# Patient Record
Sex: FEMALE
[De-identification: names, ages, dates, MRNs, and addresses within clinical notes are randomized per-mention and may not be internally consistent; named-entity substitution may affect disease eponyms.]

---

## 2020-11-25 ENCOUNTER — HOSPITAL ENCOUNTER (INPATIENT)
Dept: HOSPITAL 56 - MW.OBCHECK | Age: 25
LOS: 4 days | Discharge: HOME | End: 2020-11-29
Attending: OBSTETRICS & GYNECOLOGY | Admitting: OBSTETRICS & GYNECOLOGY
Payer: COMMERCIAL

## 2020-11-25 DIAGNOSIS — Z3A.37: ICD-10-CM

## 2020-11-25 DIAGNOSIS — Z20.828: ICD-10-CM

## 2020-11-25 PROCEDURE — U0002 COVID-19 LAB TEST NON-CDC: HCPCS

## 2020-11-25 RX ADMIN — MISOPROSTOL PRN MCG: 100 TABLET ORAL at 17:00

## 2020-11-25 RX ADMIN — MISOPROSTOL PRN MCG: 100 TABLET ORAL at 21:21

## 2020-11-26 PROCEDURE — 3E0P7VZ INTRODUCTION OF HORMONE INTO FEMALE REPRODUCTIVE, VIA NATURAL OR ARTIFICIAL OPENING: ICD-10-PCS | Performed by: OBSTETRICS & GYNECOLOGY

## 2020-11-26 PROCEDURE — 0HQ9XZZ REPAIR PERINEUM SKIN, EXTERNAL APPROACH: ICD-10-PCS | Performed by: OBSTETRICS & GYNECOLOGY

## 2020-11-26 PROCEDURE — 3E0R3BZ INTRODUCTION OF ANESTHETIC AGENT INTO SPINAL CANAL, PERCUTANEOUS APPROACH: ICD-10-PCS | Performed by: OBSTETRICS & GYNECOLOGY

## 2020-11-26 PROCEDURE — 3E033VJ INTRODUCTION OF OTHER HORMONE INTO PERIPHERAL VEIN, PERCUTANEOUS APPROACH: ICD-10-PCS | Performed by: OBSTETRICS & GYNECOLOGY

## 2020-11-26 RX ADMIN — MISOPROSTOL PRN MCG: 100 TABLET ORAL at 01:06

## 2020-11-26 RX ADMIN — MAGNESIUM SULFATE IN WATER SCH MLS/HR: 40 INJECTION, SOLUTION INTRAVENOUS at 12:58

## 2020-11-26 RX ADMIN — MISOPROSTOL PRN MCG: 100 TABLET ORAL at 05:22

## 2020-11-26 NOTE — PCM.DEL
L & D Note





- General Info


Date of Service: 20


Mother's Due Date: 20





- Delivery Note


Labor: Induced by Oxytocin (and misoprostol)


Cervical Ripening Method: Misoprostil


Delivery Outcome: Livebirth


Infant Delivery Method: Spontaneous Vaginal Delivery-Single


Infant Delivery Mode: Spontaneous


Birth Presentation: Left Occiput Anterior (ANJU)


Nuchal Cord: Present (x1, delivered through)


Anesthesia Type: Epidural


Amniotic Fluid Description: Clear


Episiotomy Type: None


Laceration: 1st Degree


Suture type: Vicryl


Suture size: 2-0


Placenta: Intact, Spontaneous


Cord: 3 Vessels


Estimated Blood Loss: 250


Resuscitation Needed: No


: Bulb Syringe, Stimulated, Warmed, [place] Used, Warmer Used


APGAR Score 1 min: 7


APGAR Score 5 min: 9


Delivery Comments (Free Text/Narrative):: 





Normal appearing, small for gestational age male in cephalic presentation. 

APGARs 7/9. Weight 2300 grams.





- General Info


Date of Service: 20





- Patient Data


Weight - Most Recent: 186 lb


I&O - Last 24 Hours: 


                                 Intake & Output











 20





 06:59 14:59 22:59


 


Intake Total  1415 


 


Output Total  650 


 


Balance  765 











Lab Results Last 24 Hours: 


                         Laboratory Results - last 24 hr











  20 Range/Units





  20:30 02:48 08:50 


 


Magnesium  5.0 H  5.6 H  6.2 H  (1.8-2.4)  mg/dL














  20 Range/Units





  14:30 


 


Magnesium  6.6 H  (1.8-2.4)  mg/dL











Med Orders - Current: 


                               Current Medications





Butorphanol Tartrate (Stadol)  1 mg IVPUSH Q1H PRN


   PRN Reason: Pain


Carboprost Tromethamine (Hemabate Ds)  250 mcg IM ASDIRECTED PRN


   PRN Reason: Post Partum Hemorrhage


Oxytocin/Sodium Chloride (Oxytocin 30 Unit/500 Ml-Ns)  30 unit in 500 mls @ 999 

mls/hr IV TITRATE ZAID


Tranexamic Acid 1,000 mg/ (Sodium Chloride)  110 mls @ 660 mls/hr IV ONETIME PRN


   PRN Reason: Bleeding


Lactated Ringer's (Ringers, Lactated)  1,000 mls @ 150 mls/hr IV ASDIRECTED ZAID


   Last Infusion: 20 11:46 Dose:  Infused


   Documented by: 


Sodium Chloride (Normal Saline)  1,000 mls @ 5 mls/hr IV ASDIRECTED ZAID


   Last Admin: 20 16:13 Dose:  5 mls/hr


   Documented by: 


Oxytocin/Sodium Chloride (Oxytocin 30 Unit/500 Ml-Ns)  30 unit in 500 mls @ 2 

mls/hr IV TITRATE [facility]; Protocol


   Last Titration: 20 13:59 Dose:  8 munits/min, 8 mls/hr


   Documented by: 


Magnesium Sulfate (Magnesium Sulfate In Water Premix)  20 gm in 500 mls @ 50 

mls/hr IV ASDIRECTED [facility]; Protocol


   Last Admin: 20 12:58 Dose:  2 gm/hr, 50 mls/hr


   Documented by: 


Lidocaine HCl (Xylocaine 1%)  50 ml INJECT ONETIME PRN


   PRN Reason: Laceration repair


Methylergonovine Maleate (Methergine)  0.2 mg IM ASDIRECTED PRN


   PRN Reason: Post Partum Hemorrhage


Misoprostol (Cytotec)  200 mcg PO ONETIME PRN


   PRN Reason: Post Partum Hemorrhage


Misoprostol (Cytotec)  25 mcg VAG ONETIME PRN


   PRN Reason: Cervical Ripening


Misoprostol (Cytotec)  25 mcg VAG Q4H PRN


   PRN Reason: Cervical Ripening


   Last Admin: 20 05:22 Dose:  25 mcg


   Documented by: 


Nalbuphine HCl (Nubain)  10 mg IVPUSH Q1H PRN


   PRN Reason: Pain (severe 7-10)


Ondansetron HCl (Zofran)  4 mg IVPUSH Q6H PRN


   PRN Reason: Nausea/Vomiting


Sodium Chloride (Saline Flush)  10 ml FLUSH ASDIRECTED PRN


   PRN Reason: Keep Vein Open


Sodium Chloride (Saline Flush)  2.5 ml FLUSH ASDIRECTED PRN


   PRN Reason: Keep Vein Open


Sodium Chloride (Normal Saline)  10 ml IV ASDIRECTED PRN


   PRN Reason: IV Use


Sodium Chloride (Saline Flush)  10 ml FLUSH ASDIRECTED PRN


   PRN Reason: Keep Vein Open


Sodium Chloride (Saline Flush)  2.5 ml FLUSH ASDIRECTED PRN


   PRN Reason: Keep Vein Open


Sodium Chloride (Normal Saline)  10 ml IV ASDIRECTED PRN


   PRN Reason: IV Use


Sterile Water (Sterile Water For Irrigation)  1,000 ml IRR ASDIRECTED PRN


   PRN Reason: delivery


Terbutaline Sulfate (Brethine)  0.25 mg SUBCUT ASDIRECTED PRN


   PRN Reason: Tacysystole





Discontinued Medications





Calcium Gluconate (Calcium Gluconate)  1 gm IV ASDIRECTED PRN


   PRN Reason: respiratory distress


Fentanyl (Sublimaze) Confirm Administered Dose 100 mcg .ROUTE .STK-MED ONE


   Stop: 20 11:03


Magnesium Sulfate 4 gm/ Premix  100 mls @ 300 mls/hr IV BOLUS ONE


   Stop: 20 15:49


   Last Admin: 20 16:15 Dose:  333 mls/hr


   Documented by: 


Magnesium Sulfate (Magnesium Sulfate In Water Premix)  20 gm in 500 mls @ 50 

mls/hr IV ASDIRECTED [facility]; Protocol


   Last Admin: 20 16:33 Dose:  2 gm/hr, 50 mls/hr


   Documented by: 


Magnesium Sulfate (Magnesium Sulfate In Water Premix) Confirm Administered Dose 

20 gm in 500 mls @ as directed .ROUTE .STK-MED ONE


   Stop: 20 02:46


   Last Admin: 20 02:50 Dose:  50 mls/hr


   Documented by: 


Ropivacaine (Naropin 0.2%) Confirm Administered Dose 100 mls @ as directed 

.ROUTE .STK-MED ONE


   Stop: 20 11:04











- Problem List Review


Problem List Initiated/Reviewed/Updated: Yes





- My Orders


Last 24 Hours: 


My Active Orders





20 18:01


Acetaminophen [Tylenol Extra Strength]   1,000 mg PO Q4H PRN 


Acetaminophen [Tylenol Extra Strength]   500 mg PO Q4H PRN 


Benzocaine/Menthol [Dermoplast Pain Relief 20%-0.5% Spray]   78 gm TOP 

ASDIRECTED PRN 


Calcium Gluconate   1 gm IV ASDIRECTED PRN 


Docusate Sodium [Colace]   100 mg PO BID PRN 


Ibuprofen [Motrin]   400 mg PO Q4H PRN 


Ibuprofen [Motrin]   800 mg PO Q6H PRN 


Lanolin [Lansinoh HPA]   See Dose Instructions  TOP ASDIRECTED PRN 


Magnesium Sulfate/Water [Magnesium Sulfate in Water Premix] 4 gm   Premix Bag 1 

bag IV BOLUS 


Sodium Chloride 0.9% [Normal Saline]   10 ml IV ASDIRECTED PRN 


Sodium Chloride 0.9% [Saline Flush]   10 ml FLUSH ASDIRECTED PRN 


Sodium Chloride 0.9% [Saline Flush]   2.5 ml FLUSH ASDIRECTED PRN 


bisacodyL [Dulcolax]   10 mg RECTAL ONETIME PRN 


oxyCODONE   5 mg PO Q2H PRN 


witch hazeL [Tucks]   1 pad TOP ASDIRECTED PRN 





20 18:02


Patient Status [ADT] Routine 


Bedrest [RC] ASDIRECTED 


Communication Order [RC] PRN 


Communication Order [RC] PRN 


Height and Weight [RC] DAILY 


Intake and Output [RC] QSHIFT 


May Shower [RC] ASDIRECTED 


Notify Provider [RC] PRN 


Oxygen Therapy [RC] PRN 


Up ad Radha [RC] ASDIRECTED 


Vital Signs [RC] ASDIRECTED 


Vital Signs [RC] PER UNIT ROUTINE 


CBC W/O DIFF,HEMOGRAM [HEME] Routine 


COMPREHENSIVE METABOLIC PN,CMP [CHEM] Routine 


URIC ACID [CHEM] Routine 


Assess Lochia [WOMSER] Per Unit Routine 


Assess Uterine Involution [WOMSER] Per Unit Routine 


Electronic Fetal Heart Tones Ext w TOCO [WOMSER] Per Unit Routine 


Peripheral IV Discontinue [OM.PC] Routine 


Peripheral IV Insertion Adult [OM.PC] Routine 





20 18:03


Fetal Heart Tones [RC] ASDIRECTED 





20 18:05


Equipment to Bedside [RC] PRN 


Notify Provider Status Change [RC] ASDIRECTED 





20 18:15


Magnesium Sulfate/Water [Magnesium Sulfate in Water Premix] 20 gm in 500 ml IV 

ASDIRECTED 


Deep Tendon Reflexes [WOMSER] Q1H 





20 19:15


Deep Tendon Reflexes [WOMSER] Q1H 





20 20:15


Deep Tendon Reflexes [WOMSER] Q1H 





20 21:15


Deep Tendon Reflexes [WOMSER] Q1H 





20 22:15


Deep Tendon Reflexes [WOMSER] Q1H 





20 23:15


Deep Tendon Reflexes [WOMSER] Q1H 





20 00:15


Deep Tendon Reflexes [WOMSER] Q1H 





20 01:15


Deep Tendon Reflexes [WOMSER] Q1H 





20 02:15


Deep Tendon Reflexes [WOMSER] [facility] 





20 03:15


Deep Tendon Reflexes [WOMSER] [facility] 





20 04:15


Deep Tendon Reflexes [WOMSER] [facility] 





20 05:15


Deep Tendon Reflexes [WOMSER] [facility] 





20 06:15


Deep Tendon Reflexes [WOMSER] [facility] 





20 07:15


Deep Tendon Reflexes [WOMSER] [facility] 





20 08:15


Deep Tendon Reflexes [WOMSER] [facility] 





20 09:15


Deep Tendon Reflexes [WOMSER] [facility] 





20 10:15


Deep Tendon Reflexes [WOMSER] [facility] 





20 11:15


Deep Tendon Reflexes [WOMSER] [facility] 





20 12:15


Deep Tendon Reflexes [WOMSER] [facility] 





20 13:15


Deep Tendon Reflexes [WOMSER] [facility] 





20 14:15


Deep Tendon Reflexes [WOMSER] [facility] 





20 15:15


Deep Tendon Reflexes [WOMSER] [facility] 





20 16:15


Deep Tendon Reflexes [WOMSER] [facility] 





20 17:15


Deep Tendon Reflexes [WOMSER] Q1H 














- Assessment


Assessment:: 





25 year old G1 now P1 female s/p spontaneous vaginal delivery at 37w1d with 

preeclampsia with severe features





- Plan


Plan:: 





Routine postpartum cares


* Rh positive, rubella immune, GBS negative


* Encourage ambulation and fluid intake when able


* PO pain medication ordered for PRN use


* Regular diet when tolerating PO


* Breastfeeding, nursing assistance as needed


* Monitor postpartum bleeding





Preeclampsia with severe features


* VSS, BP normotensive to mild range during labor. Will continue to monitor per 

  protocol.


* Asymptomatic.


* Continue Magnesium sulfate x24hrs postpartum for seizure prophylaxis.


* Minor catheter for assessment of output.


* Repeat preeclampsia labs and magnesium level in the AM.


* Will use antihypertensive therapy as indicated.





Dispo: stable. Patient and infant recovering in LDR room at this time.





Dictation #[id number]

## 2020-11-26 NOTE — PCM.PRNOTE
- Free Text/Narrative


Note: 





Anes Note





Patient requests epidural for L&D.





Sterile fenestrated drape applied.  Sitting position. Level L3-L4 midline 

approach. Sterile technique. Chloraprep scrub to lumbar area. 





Sterile fenestrated drape applied.  Epidural space easily achieved single 

attempt using ELENO technique. ELENO at 3 cm. Cath threaded 6 cm with ease. Cath 

secured a t skin using sterile clear adhesive dressing. 





Test 1110 3 cc 1.5% lido with epi negative.





Load 1115 10 cc 0.2% ropivicaine with 1 mcg cc fentanyl in slow divided doses. 





Pump started with 90 cc same solution. Rate is 8 cc hr with 6 cc q 20 min prn 

bolus. 





Adriana well.





Time with patient [id number]





Josh Gil CRNA

## 2020-11-26 NOTE — PCM.PREANE
Preanesthetic Assessment





- Anesthesia/Transfusion/Family Hx


Anesthesia History: Prior Anesthesia Without Reaction


Family History of Anesthesia Reaction: No





- Review of Systems


Cardiovascular: No Symptoms (Pre Eclampsia with pitting edema. On MG drip...)





- Physical Assessment


NPO Status Date: 11/26/20


NPO Status Time: 06:00


Height: 1.68 m


Weight: 84.368 kg


ASA Class: 2





- Lab


Values: 





                             Laboratory Last Values











Magnesium  6.2 mg/dL (1.8-2.4)  H  11/26/20  08:50    


 


SARS-CoV-2 RNA (KAI)  NEGATIVE  (NEGATIVE)   11/25/20  16:10    


 


Blood Type  A POSITIVE   11/25/20  16:19    


 


Antibody Screen  NEGATIVE   11/25/20  16:19    














- Allergies


Allergies/Adverse Reactions: 


                                    Allergies











Allergy/AdvReac Type Severity Reaction Status Date / Time


 


pumpkin Allergy  Hives Verified 11/25/20 15:27














- Acknowledgements


Anesthesia Type Planned: Epidural


Pt an Appropriate Candidate for the Planned Anesthesia: Yes


Alternatives and Risks of Anesthesia Discussed w Pt/Guardian: Yes


Pt/Guardian Understands and Agrees with Anesthesia Plan: Yes





PreAnesthesia Questionnaire





- Past Health History


Medical/Surgical History: Denies Medical/Surgical History


OB/GYN History: Reports: Pregnancy





- SUBSTANCE USE


Tobacco Use Status *Q: Never Tobacco User


Recreational Drug Use History: No





- CURRENT (IN HOUSE) MEDS


Current Meds: 





                               Current Medications





Butorphanol Tartrate (Stadol)  1 mg IVPUSH Q1H PRN


   PRN Reason: Pain


Carboprost Tromethamine (Hemabate Ds)  250 mcg IM ASDIRECTED PRN


   PRN Reason: Post Partum Hemorrhage


Oxytocin/Sodium Chloride (Oxytocin 30 Unit/500 Ml-Ns)  30 unit in 500 mls @ 999 

mls/hr IV TITRATE ZAID


Tranexamic Acid 1,000 mg/ (Sodium Chloride)  110 mls @ 660 mls/hr IV ONETIME PRN


   PRN Reason: Bleeding


Lactated Ringer's (Ringers, Lactated)  1,000 mls @ 150 mls/hr IV ASDIRECTED ZAID


   Last Admin: 11/26/20 10:20 Dose:  999 mls/hr


   Documented by: 


Sodium Chloride (Normal Saline)  1,000 mls @ 5 mls/hr IV ASDIRECTED ZAID


   Last Admin: 11/25/20 16:13 Dose:  5 mls/hr


   Documented by: 


Oxytocin/Sodium Chloride (Oxytocin 30 Unit/500 Ml-Ns)  30 unit in 500 mls @ 2 

mls/hr IV TITRATE ZAID; Protocol


   Last Titration: 11/26/20 10:16 Dose:  4 munits/min, 4 mls/hr


   Documented by: 


Magnesium Sulfate (Magnesium Sulfate In Water Premix)  20 gm in 500 mls @ 50 

mls/hr IV ASDIRECTED ZAID; Protocol


Lidocaine HCl (Xylocaine 1%)  50 ml INJECT ONETIME PRN


   PRN Reason: Laceration repair


Methylergonovine Maleate (Methergine)  0.2 mg IM ASDIRECTED PRN


   PRN Reason: Post Partum Hemorrhage


Misoprostol (Cytotec)  200 mcg PO ONETIME PRN


   PRN Reason: Post Partum Hemorrhage


Misoprostol (Cytotec)  25 mcg VAG ONETIME PRN


   PRN Reason: Cervical Ripening


Misoprostol (Cytotec)  25 mcg VAG Q4H PRN


   PRN Reason: Cervical Ripening


   Last Admin: 11/26/20 05:22 Dose:  25 mcg


   Documented by: 


Nalbuphine HCl (Nubain)  10 mg IVPUSH Q1H PRN


   PRN Reason: Pain (severe 7-10)


Ondansetron HCl (Zofran)  4 mg IVPUSH Q6H PRN


   PRN Reason: Nausea/Vomiting


Sodium Chloride (Saline Flush)  10 ml FLUSH ASDIRECTED PRN


   PRN Reason: Keep Vein Open


Sodium Chloride (Saline Flush)  2.5 ml FLUSH ASDIRECTED PRN


   PRN Reason: Keep Vein Open


Sodium Chloride (Normal Saline)  10 ml IV ASDIRECTED PRN


   PRN Reason: IV Use


Sodium Chloride (Saline Flush)  10 ml FLUSH ASDIRECTED PRN


   PRN Reason: Keep Vein Open


Sodium Chloride (Saline Flush)  2.5 ml FLUSH ASDIRECTED PRN


   PRN Reason: Keep Vein Open


Sodium Chloride (Normal Saline)  10 ml IV ASDIRECTED PRN


   PRN Reason: IV Use


Sterile Water (Sterile Water For Irrigation)  1,000 ml IRR ASDIRECTED PRN


   PRN Reason: delivery


Terbutaline Sulfate (Brethine)  0.25 mg SUBCUT ASDIRECTED PRN


   PRN Reason: Tacysystole





Discontinued Medications





Calcium Gluconate (Calcium Gluconate)  1 gm IV ASDIRECTED PRN


   PRN Reason: respiratory distress


Fentanyl (Sublimaze) Confirm Administered Dose 100 mcg .ROUTE .STK-MED ONE


   Stop: 11/26/20 11:03


Magnesium Sulfate 4 gm/ Premix  100 mls @ 300 mls/hr IV BOLUS ONE


   Stop: 11/25/20 15:49


   Last Admin: 11/25/20 16:15 Dose:  333 mls/hr


   Documented by: 


Magnesium Sulfate (Magnesium Sulfate In Water Premix)  20 gm in 500 mls @ 50 

mls/hr IV ASDIRECTED [facility]; Protocol


   Last Admin: 11/25/20 16:33 Dose:  2 gm/hr, 50 mls/hr


   Documented by: 


Magnesium Sulfate (Magnesium Sulfate In Water Premix) Confirm Administered Dose 

20 gm in 500 mls @ as directed .ROUTE .STK-MED ONE


   Stop: 11/26/20 02:46


   Last Admin: 11/26/20 02:50 Dose:  50 mls/hr


   Documented by: 


Ropivacaine (Naropin 0.2%) Confirm Administered Dose 100 mls @ as directed 

.ROUTE .STK-MED ONE


   Stop: 11/26/20 11:04

## 2020-11-27 LAB
BUN SERPL-MCNC: 12 MG/DL (ref 7–18)
CHLORIDE SERPL-SCNC: 104 MMOL/L (ref 98–107)
CO2 SERPL-SCNC: 24.4 MMOL/L (ref 21–32)
GLUCOSE SERPL-MCNC: 83 MG/DL (ref 74–106)
POTASSIUM SERPL-SCNC: 4.4 MMOL/L (ref 3.5–5.1)
SODIUM SERPL-SCNC: 137 MMOL/L (ref 136–145)

## 2020-11-27 RX ADMIN — MAGNESIUM SULFATE IN WATER SCH MLS/HR: 40 INJECTION, SOLUTION INTRAVENOUS at 09:28

## 2020-11-27 NOTE — PCM.PNPP
- General Info


Date of Service: 20


Admission Dx/Problem (Free Text): 





25 year old G1 now P1 PPD 1 s/p spontaneous vaginal delivery with preeclampsia 

with severe features


Subjective Update: 





Resting comfortably in bed during rounds. Pain well controlled. Lochia 

decreasing. Curran catheter in place, adequate urine output. Tolerating PO intake

without nausea/vomiting. Denies headache, vision changes or RUQ pain. 





- General Info


Date of Service: 20





- Patient Data


Vital Signs - Most Recent: 


                                Last Vital Signs











Temp  98.1 F   20 07:00


 


Pulse  79   20 08:00


 


Resp  16   20 08:00


 


BP  133/84   20 08:00


 


Pulse Ox  97   20 08:00











Weight - Most Recent: 186 lb


I&O - Last 24 Hours: 


                                 Intake & Output











 20





 22:59 06:59 14:59


 


Intake Total 1650 500 


 


Output Total 300 2000 


 


Balance 1350 -1500 











Lab Results - Last 24 Hours: 


                         Laboratory Results - last 24 hr











  20 Range/Units





  08:50 14:30 17:42 


 


WBC     (4.0-11.0)  K/uL


 


RBC     (4.30-5.90)  M/uL


 


Hgb     (12.0-16.0)  g/dL


 


Hct     (36.0-46.0)  %


 


MCV     (80.0-98.0)  fL


 


MCH     (27.0-32.0)  pg


 


MCHC     (31.0-37.0)  g/dL


 


RDW Std Deviation     (28.0-62.0)  fl


 


RDW Coeff of Nat     (11.0-15.0)  %


 


Plt Count     (150-400)  K/uL


 


MPV     (7.40-12.00)  fL


 


Nucleated RBC %     /100WBC


 


Nucleated RBCs #     K/uL


 


Cord ABG pH    7.128 L  (7.18-7.38)  


 


Cord ABG Base Excess    -10  (-10--2)  


 


Cord VBG pH    7.228 L  (7.25-7.45)  


 


Cord VBG Base Excess    -10  (-10--2)  


 


Sodium     (136-145)  mmol/L


 


Potassium     (3.5-5.1)  mmol/L


 


Chloride     ()  mmol/L


 


Carbon Dioxide     (21.0-32.0)  mmol/L


 


BUN     (7.0-18.0)  mg/dL


 


Creatinine     (0.6-1.0)  mg/dL


 


Est Cr Clr Drug Dosing     mL/min


 


Estimated GFR (MDRD)     ml/min


 


Glucose     ()  mg/dL


 


Calcium     (8.5-10.1)  mg/dL


 


Magnesium  6.2 H  6.6 H   (1.8-2.4)  mg/dL


 


Total Bilirubin     (0.2-1.0)  mg/dL


 


AST     (15-37)  IU/L


 


ALT     (14-63)  IU/L


 


Alkaline Phosphatase     ()  U/L


 


Total Protein     (6.4-8.2)  g/dL


 


Albumin     (3.4-5.0)  g/dL


 


Globulin     (2.6-4.0)  g/dL


 


Albumin/Globulin Ratio     (0.9-1.6)  














  20 Range/Units





  20:40 08:39 08:39 


 


WBC   14.31 H   (4.0-11.0)  K/uL


 


RBC   3.75 L   (4.30-5.90)  M/uL


 


Hgb   11.8 L   (12.0-16.0)  g/dL


 


Hct   35.7 L   (36.0-46.0)  %


 


MCV   95.2   (80.0-98.0)  fL


 


MCH   31.5   (27.0-32.0)  pg


 


MCHC   33.1   (31.0-37.0)  g/dL


 


RDW Std Deviation   45.4   (28.0-62.0)  fl


 


RDW Coeff of Nat   13   (11.0-15.0)  %


 


Plt Count   194   (150-400)  K/uL


 


MPV   10.90   (7.40-12.00)  fL


 


Nucleated RBC %   0.0   /100WBC


 


Nucleated RBCs #   0   K/uL


 


Cord ABG pH     (7.18-7.38)  


 


Cord ABG Base Excess     (-10--2)  


 


Cord VBG pH     (7.25-7.45)  


 


Cord VBG Base Excess     (-10--2)  


 


Sodium    137  (136-145)  mmol/L


 


Potassium    4.4  (3.5-5.1)  mmol/L


 


Chloride    104  ()  mmol/L


 


Carbon Dioxide    24.4  (21.0-32.0)  mmol/L


 


BUN    12  (7.0-18.0)  mg/dL


 


Creatinine    0.9  (0.6-1.0)  mg/dL


 


Est Cr Clr Drug Dosing    87.90  mL/min


 


Estimated GFR (MDRD)    > 60.0  ml/min


 


Glucose    83  ()  mg/dL


 


Calcium    6.3 L  (8.5-10.1)  mg/dL


 


Magnesium  7.6 H    (1.8-2.4)  mg/dL


 


Total Bilirubin    0.4  (0.2-1.0)  mg/dL


 


AST    24  (15-37)  IU/L


 


ALT    16  (14-63)  IU/L


 


Alkaline Phosphatase    137 H  ()  U/L


 


Total Protein    5.9 L  (6.4-8.2)  g/dL


 


Albumin    2.0 L  (3.4-5.0)  g/dL


 


Globulin    3.9  (2.6-4.0)  g/dL


 


Albumin/Globulin Ratio    0.5 L  (0.9-1.6)  











Med Orders - Current: 


                               Current Medications





Acetaminophen (Tylenol Extra Strength)  500 mg PO Q4H PRN


   PRN Reason: Pain


Acetaminophen (Tylenol Extra Strength)  1,000 mg PO Q4H PRN


   PRN Reason: Pain


Benzocaine/Menthol (Dermoplast Pain Relief 20%-0.5% Spray)  78 gm TOP ASDIRECTED

PRN


   PRN Reason: Perineal Comfort Measure


   Last Admin: 20 19:53 Dose:  1 can


   Documented by: 


Bisacodyl (Dulcolax)  10 mg RECTAL ONETIME PRN


   PRN Reason: Constipation


Butorphanol Tartrate (Stadol)  1 mg IVPUSH Q1H PRN


   PRN Reason: Pain


Calcium Gluconate (Calcium Gluconate)  1 gm IV ASDIRECTED PRN


   PRN Reason: respiratory distress


Carboprost Tromethamine (Hemabate Ds)  250 mcg IM ASDIRECTED PRN


   PRN Reason: Post Partum Hemorrhage


Docusate Sodium (Colace)  100 mg PO BID PRN


   PRN Reason: Constipation


   Last Admin: 20 19:52 Dose:  100 mg


   Documented by: 


Emollient Ointment (Lansinoh Hpa)  0 gm TOP ASDIRECTED PRN


   PRN Reason: Sore Nipples


Oxytocin/Sodium Chloride (Oxytocin 30 Unit/500 Ml-Ns)  30 unit in 500 mls @ 999 

mls/hr IV TITRATE ZAID


Tranexamic Acid 1,000 mg/ (Sodium Chloride)  110 mls @ 660 mls/hr IV ONETIME PRN


   PRN Reason: Bleeding


Lactated Ringer's (Ringers, Lactated)  1,000 mls @ 150 mls/hr IV ASDIRECTED ZAID


   Last Infusion: 20 11:46 Dose:  Infused


   Documented by: 


Sodium Chloride (Normal Saline)  1,000 mls @ 5 mls/hr IV ASDIRECTED ZAID


   Last Admin: 20 16:13 Dose:  5 mls/hr


   Documented by: 


Oxytocin/Sodium Chloride (Oxytocin 30 Unit/500 Ml-Ns)  30 unit in 500 mls @ 2 

mls/hr IV TITRATE ZAID; Protocol


   Last Titration: 20 17:45 Dose:  999 munits/min, 999 mls/hr


   Documented by: 


Magnesium Sulfate (Magnesium Sulfate In Water Premix)  20 gm in 500 mls @ 50 

mls/hr IV ASDIRECTED ZAID; Protocol


   Last Admin: 20 09:28 Dose:  2 gm/hr, 50 mls/hr


   Documented by: 


Magnesium Sulfate (Magnesium Sulfate In Water Premix)  20 gm in 500 mls @ 50 

mls/hr IV ASDIRECTED ZAID; Protocol


Ibuprofen (Motrin)  400 mg PO Q4H PRN


   PRN Reason: Pain


Ibuprofen (Motrin)  800 mg PO Q6H PRN


   PRN Reason: Pain


   Last Admin: 20 19:52 Dose:  800 mg


   Documented by: 


Lidocaine HCl (Xylocaine 1%)  50 ml INJECT ONETIME PRN


   PRN Reason: Laceration repair


Methylergonovine Maleate (Methergine)  0.2 mg IM ASDIRECTED PRN


   PRN Reason: Post Partum Hemorrhage


Misoprostol (Cytotec)  200 mcg PO ONETIME PRN


   PRN Reason: Post Partum Hemorrhage


Misoprostol (Cytotec)  25 mcg VAG ONETIME PRN


   PRN Reason: Cervical Ripening


Misoprostol (Cytotec)  25 mcg VAG Q4H PRN


   PRN Reason: Cervical Ripening


   Last Admin: 20 05:22 Dose:  25 mcg


   Documented by: 


Nalbuphine HCl (Nubain)  10 mg IVPUSH Q1H PRN


   PRN Reason: Pain (severe 7-10)


Ondansetron HCl (Zofran)  4 mg IVPUSH Q6H PRN


   PRN Reason: Nausea/Vomiting


Oxycodone HCl (Oxycodone)  5 mg PO Q2H PRN


   PRN Reason: Pain


Sodium Chloride (Saline Flush)  10 ml FLUSH ASDIRECTED PRN


   PRN Reason: Keep Vein Open


Sodium Chloride (Saline Flush)  2.5 ml FLUSH ASDIRECTED PRN


   PRN Reason: Keep Vein Open


Sodium Chloride (Normal Saline)  10 ml IV ASDIRECTED PRN


   PRN Reason: IV Use


Sodium Chloride (Saline Flush)  10 ml FLUSH ASDIRECTED PRN


   PRN Reason: Keep Vein Open


Sodium Chloride (Saline Flush)  2.5 ml FLUSH ASDIRECTED PRN


   PRN Reason: Keep Vein Open


Sodium Chloride (Normal Saline)  10 ml IV ASDIRECTED PRN


   PRN Reason: IV Use


Sterile Water (Sterile Water For Irrigation)  1,000 ml IRR ASDIRECTED PRN


   PRN Reason: delivery


Terbutaline Sulfate (Brethine)  0.25 mg SUBCUT ASDIRECTED PRN


   PRN Reason: Tacysystole


Witch Hazel (Tucks)  1 pad TOP ASDIRECTED PRN


   PRN Reason: comfort care





Discontinued Medications





Calcium Gluconate (Calcium Gluconate)  1 gm IV ASDIRECTED PRN


   PRN Reason: respiratory distress


Fentanyl (Sublimaze) Confirm Administered Dose 100 mcg .ROUTE .STK-MED ONE


   Stop: 20 11:03


Magnesium Sulfate 4 gm/ Premix  100 mls @ 300 mls/hr IV BOLUS ONE


   Stop: 20 15:49


   Last Admin: 20 16:15 Dose:  333 mls/hr


   Documented by: 


Magnesium Sulfate (Magnesium Sulfate In Water Premix)  20 gm in 500 mls @ 50 

mls/hr IV ASDIRECTED ZAID; Protocol


   Last Admin: 20 16:33 Dose:  2 gm/hr, 50 mls/hr


   Documented by: 


Magnesium Sulfate (Magnesium Sulfate In Water Premix) Confirm Administered Dose 

20 gm in 500 mls @ as directed .ROUTE .STK-MED ONE


   Stop: 20 02:46


   Last Admin: 20 02:50 Dose:  50 mls/hr


   Documented by: 


Ropivacaine (Naropin 0.2%) Confirm Administered Dose 100 mls @ as directed 

.ROUTE .STK-MED ONE


   Stop: 20 11:04


Magnesium Sulfate 4 gm/ Premix  100 mls @ 300 mls/hr IV BOLUS ONE


   Stop: 20 18:20


Sodium Chloride (Saline Flush)  10 ml FLUSH ASDIRECTED PRN


   PRN Reason: Keep Vein Open


Sodium Chloride (Saline Flush)  2.5 ml FLUSH ASDIRECTED PRN


   PRN Reason: Keep Vein Open


Sodium Chloride (Normal Saline)  10 ml IV ASDIRECTED PRN


   PRN Reason: IV Use











- Infant Interaction


Infant Disposition, Postpartum: [place] in Room with Family


Infant Feeding: Attempted Breastfeeding; Nursed Fair/Poor (supplementing with 

formula)


Support Person: 





- Postpartum Recovery Exam


Fundal Tone: Firm


Fundal Level: 1 Fingerbreadths Below Umbilicus


Fundal Placement: Midline


Lochia Amount: Small


Lochia Color: Rubra/Red


Perineum Description: Intact, Minimal Bruising/Swelling


Episiotomy/Laceration: Approximated


Bladder Status: Indwelling Catheter in Place


Urinary Elimination: Indwelling Catheter





- Exam


General: Alert


Lungs: Clear to Auscultation, Normal Respiratory Effort


Cardiovascular: Regular Rate, Regular Rhythm


GI/Abdominal Exam: Soft, Tender (appropriate for postpartum)


Extremities: Normal Inspection, No Pedal Edema


Skin: Warm, Dry, Intact


Wound/Incisions: Healing Well


Neurological: No New Focal Deficit


Psy/Mental Status: Normal Mood





- Problem List Review


Problem List Initiated/Reviewed/Updated: Yes





- My Orders


Last 24 Hours: 


My Active Orders





20 18:01


Acetaminophen [Tylenol Extra Strength]   1,000 mg PO Q4H PRN 


Acetaminophen [Tylenol Extra Strength]   500 mg PO Q4H PRN 


Benzocaine/Menthol [Dermoplast Pain Relief 20%-0.5% Spray]   78 gm TOP 

ASDIRECTED PRN 


Calcium Gluconate   1 gm IV ASDIRECTED PRN 


Docusate Sodium [Colace]   100 mg PO BID PRN 


Ibuprofen [Motrin]   400 mg PO Q4H PRN 


Ibuprofen [Motrin]   800 mg PO Q6H PRN 


Lanolin [Lansinoh HPA]   See Dose Instructions  TOP ASDIRECTED PRN 


bisacodyL [Dulcolax]   10 mg RECTAL ONETIME PRN 


oxyCODONE   5 mg PO Q2H PRN 


witch hazeL [Tucks]   1 pad TOP ASDIRECTED PRN 





20 18:02


Patient Status [ADT] Routine 


Bedrest [RC] ASDIRECTED 


Communication Order [RC] PRN 


Communication Order [RC] PRN 


Height and Weight [RC] DAILY 


Intake and Output [RC] QSHIFT 


May Shower [RC] ASDIRECTED 


Notify Provider [RC] PRN 


Oxygen Therapy [RC] PRN 


Up ad Radha [RC] ASDIRECTED 


Vital Signs [RC] ASDIRECTED 


Vital Signs [RC] PER UNIT ROUTINE 


Assess Lochia [WOMSER] Per Unit Routine 


Assess Uterine Involution [WOMSER] Per Unit Routine 


Electronic Fetal Heart Tones Ext w TOCO [WOMSER] Per Unit Routine 


Peripheral IV Discontinue [OM.PC] Routine 


Peripheral IV Insertion Adult [OM.PC] Routine 





20 18:05


Equipment to Bedside [RC] PRN 


Notify Provider Status Change [RC] ASDIRECTED 





20 18:15


Magnesium Sulfate/Water [Magnesium Sulfate in Water Premix] 20 gm in 500 ml IV 

ASDIRECTED 


Deep Tendon Reflexes [WOMSER] Q1 





20 19:15


Deep Tendon Reflexes [WOMSER] [facility] 





20 20:15


Deep Tendon Reflexes [WOMSER] [facility] 





20 21:15


Deep Tendon Reflexes [WOMSER] [facility] 





20 22:15


Deep Tendon Reflexes [WOMSER] [facility] 





20 23:15


Deep Tendon Reflexes [WOMSER] [facility] 





20 00:15


Deep Tendon Reflexes [WOMSER] [facility] 





20 01:15


Deep Tendon Reflexes [WOMSER] [facility] 





20 02:15


Deep Tendon Reflexes [WOMSER] [facility] 





20 03:15


Deep Tendon Reflexes [WOMSER] [facility] 





20 04:15


Deep Tendon Reflexes [WOMSER] [facility] 





20 05:15


Deep Tendon Reflexes [WOMSER] [facility] 





20 06:15


Deep Tendon Reflexes [WOMSER] [facility] 





20 07:15


Deep Tendon Reflexes [WOMSER] [facility] 





20 08:15


Deep Tendon Reflexes [WOMSER] [facility] 





20 09:15


Deep Tendon Reflexes [WOMSER] Q1H 





20 10:15


Deep Tendon Reflexes [WOMSER] Q1H 





20 11:15


Deep Tendon Reflexes [WOMSER] Q1H 





20 12:15


Deep Tendon Reflexes [WOMSER] Q1H 





20 13:15


Deep Tendon Reflexes [WOMSER] Q1H 





20 14:15


Deep Tendon Reflexes [WOMSER] Q1H 





20 15:15


Deep Tendon Reflexes [WOMSER] Q1H 





20 16:15


Deep Tendon Reflexes [WOMSER] Q1H 





20 17:15


Deep Tendon Reflexes [WOMSER] Q1H 














- Assessment


Assessment:: 





25 year old G1 now P1 female PPD 1 s/p spontaneous vaginal delivery at 37w1d 

with preeclampsia with severe features





- Plan


Plan:: 





Routine postpartum cares


* Rh positive, rubella immune, GBS negative


* Encourage ambulation and fluid intake when able


* PO pain medication ordered for PRN use


* Regular diet when tolerating PO


* Breastfeeding, nursing assistance as needed


* Monitor postpartum bleeding





Preeclampsia with severe features


* VSS, BP normotensive overnight with one mild range (systolic 142) this AM. 

  Will continue to monitor per protocol.


* Asymptomatic.


* Continue Magnesium sulfate x24hrs postpartum for seizure prophylaxis.


* Curran catheter for assessment of output, adequate overnight.


* Repeat preeclampsia labs and magnesium level in the AM, within normal limits.


* Will use antihypertensive therapy as indicated.





Dispo: stable. Will discontinue Magnesium sulfate and curran catheter 24hrs post 

delivery. Will continue to monitor blood pressures and symptoms for 24-48 hours 

pending patient status.

## 2020-11-27 NOTE — [id number]
Post Anesthesia Note





- EVALUATION WITHIN 48HRS OF ANESTHETIC


Vital Signs in Normal Range: Yes


Patient Participated in Evaluation: Yes


Respiratory Function Stable: Yes


Airway Patent: Yes


Cardiovascular Function Stable: Yes


Hydration Status Stable: Yes


Pain Control Satisfactory: Yes


Nausea and Vomiting Control Satisfactory: Yes


Mental Status Recovered: Yes


Vital Signs: 


                                Last Vital Signs











Temp  36.4 C   11/27/20 00:39


 


Pulse  89   11/27/20 00:39


 


Resp  16   11/27/20 00:39


 


BP  130/70   11/27/20 00:39


 


Pulse Ox

## 2020-11-28 NOTE — PCM.PNPP
- General Info


Date of Service: 20


Admission Dx/Problem (Free Text): 





25 year old G1 now P1 PPD 2 s/p spontaneous vaginal delivery with preeclampsia 

with severe features


Subjective Update: 





Resting comfortably in bed during rounds. Pain well controlled. Lochia 

decreasing. Minor catheter discontinued last evening. Ambulating and voiding 

without difficulty. Tolerating PO intake without nausea/vomiting. Denies 

headache, vision changes or RUQ pain. Breast pumping for baby going well.





- General Info


Date of Service: 20





- Patient Data


Vital Signs - Most Recent: 


                                Last Vital Signs











Temp  97.9 F   20 07:30


 


Pulse  75   20 07:30


 


Resp  16   20 07:30


 


BP  130/83   20 07:30


 


Pulse Ox  97   20 07:30











Weight - Most Recent: 186 lb


I&O - Last 24 Hours: 


                                 Intake & Output











 20





 22:59 06:59 14:59


 


Intake Total 400  


 


Output Total 2075  


 


Balance -1675  











Lab Results - Last 24 Hours: 


                         Laboratory Results - last 24 hr











  20 Range/Units





  08:39 08:39 


 


WBC  14.31 H   (4.0-11.0)  K/uL


 


RBC  3.75 L   (4.30-5.90)  M/uL


 


Hgb  11.8 L   (12.0-16.0)  g/dL


 


Hct  35.7 L   (36.0-46.0)  %


 


MCV  95.2   (80.0-98.0)  fL


 


MCH  31.5   (27.0-32.0)  pg


 


MCHC  33.1   (31.0-37.0)  g/dL


 


RDW Std Deviation  45.4   (28.0-62.0)  fl


 


RDW Coeff of Nat  13   (11.0-15.0)  %


 


Plt Count  194   (150-400)  K/uL


 


MPV  10.90   (7.40-12.00)  fL


 


Nucleated RBC %  0.0   /100WBC


 


Nucleated RBCs #  0   K/uL


 


Sodium   137  (136-145)  mmol/L


 


Potassium   4.4  (3.5-5.1)  mmol/L


 


Chloride   104  ()  mmol/L


 


Carbon Dioxide   24.4  (21.0-32.0)  mmol/L


 


BUN   12  (7.0-18.0)  mg/dL


 


Creatinine   0.9  (0.6-1.0)  mg/dL


 


Est Cr Clr Drug Dosing   87.90  mL/min


 


Estimated GFR (MDRD)   > 60.0  ml/min


 


Glucose   83  ()  mg/dL


 


Calcium   6.3 L  (8.5-10.1)  mg/dL


 


Total Bilirubin   0.4  (0.2-1.0)  mg/dL


 


AST   24  (15-37)  IU/L


 


ALT   16  (14-63)  IU/L


 


Alkaline Phosphatase   137 H  ()  U/L


 


Total Protein   5.9 L  (6.4-8.2)  g/dL


 


Albumin   2.0 L  (3.4-5.0)  g/dL


 


Globulin   3.9  (2.6-4.0)  g/dL


 


Albumin/Globulin Ratio   0.5 L  (0.9-1.6)  











Med Orders - Current: 


                               Current Medications





Acetaminophen (Tylenol Extra Strength)  500 mg PO Q4H PRN


   PRN Reason: Pain


Acetaminophen (Tylenol Extra Strength)  1,000 mg PO Q4H PRN


   PRN Reason: Pain


Benzocaine/Menthol (Dermoplast Pain Relief 20%-0.5% Spray)  78 gm TOP ASDIRECTED

PRN


   PRN Reason: Perineal Comfort Measure


   Last Admin: 20 19:53 Dose:  1 can


   Documented by: 


Bisacodyl (Dulcolax)  10 mg RECTAL ONETIME PRN


   PRN Reason: Constipation


Butorphanol Tartrate (Stadol)  1 mg IVPUSH Q1H PRN


   PRN Reason: Pain


Calcium Gluconate (Calcium Gluconate)  1 gm IV ASDIRECTED PRN


   PRN Reason: respiratory distress


Carboprost Tromethamine (Hemabate Ds)  250 mcg IM ASDIRECTED PRN


   PRN Reason: Post Partum Hemorrhage


Docusate Sodium (Colace)  100 mg PO BID PRN


   PRN Reason: Constipation


   Last Admin: 20 20:28 Dose:  100 mg


   Documented by: 


Emollient Ointment (Lansinoh Hpa)  0 gm TOP ASDIRECTED PRN


   PRN Reason: Sore Nipples


Oxytocin/Sodium Chloride (Oxytocin 30 Unit/500 Ml-Ns)  30 unit in 500 mls @ 999 

mls/hr IV TITRATE ZAID


Tranexamic Acid 1,000 mg/ (Sodium Chloride)  110 mls @ 660 mls/hr IV ONETIME PRN


   PRN Reason: Bleeding


Lactated Ringer's (Ringers, Lactated)  1,000 mls @ 150 mls/hr IV ASDIRECTED [facility]


   Last Infusion: 20 11:46 Dose:  Infused


   Documented by: 


Sodium Chloride (Normal Saline)  1,000 mls @ 5 mls/hr IV ASDIRECTED [facility]


   Last Admin: 20 16:13 Dose:  5 mls/hr


   Documented by: 


Oxytocin/Sodium Chloride (Oxytocin 30 Unit/500 Ml-Ns)  30 unit in 500 mls @ 2 

mls/hr IV TITRATE [facility]; Protocol


   Last Titration: 20 17:45 Dose:  999 munits/min, 999 mls/hr


   Documented by: 


Ibuprofen (Motrin)  400 mg PO Q4H PRN


   PRN Reason: Pain


Ibuprofen (Motrin)  800 mg PO Q6H PRN


   PRN Reason: Pain


   Last Admin: 20 06:37 Dose:  800 mg


   Documented by: 


Lidocaine HCl (Xylocaine 1%)  50 ml INJECT ONETIME PRN


   PRN Reason: Laceration repair


Methylergonovine Maleate (Methergine)  0.2 mg IM ASDIRECTED PRN


   PRN Reason: Post Partum Hemorrhage


Misoprostol (Cytotec)  200 mcg PO ONETIME PRN


   PRN Reason: Post Partum Hemorrhage


Misoprostol (Cytotec)  25 mcg VAG ONETIME PRN


   PRN Reason: Cervical Ripening


Misoprostol (Cytotec)  25 mcg VAG Q4H PRN


   PRN Reason: Cervical Ripening


   Last Admin: 20 05:22 Dose:  25 mcg


   Documented by: 


Nalbuphine HCl (Nubain)  10 mg IVPUSH Q1H PRN


   PRN Reason: Pain (severe 7-10)


Nifedipine (Procardia)  30 mg PO BID [facility]


   Last Admin: 20 08:21 Dose:  Not Given


   Documented by: 


Ondansetron HCl (Zofran)  4 mg IVPUSH Q6H PRN


   PRN Reason: Nausea/Vomiting


Oxycodone HCl (Oxycodone)  5 mg PO Q2H PRN


   PRN Reason: Pain


Sodium Chloride (Saline Flush)  10 ml FLUSH ASDIRECTED PRN


   PRN Reason: Keep Vein Open


Sodium Chloride (Saline Flush)  2.5 ml FLUSH ASDIRECTED PRN


   PRN Reason: Keep Vein Open


Sodium Chloride (Normal Saline)  10 ml IV ASDIRECTED PRN


   PRN Reason: IV Use


Sodium Chloride (Saline Flush)  10 ml FLUSH ASDIRECTED PRN


   PRN Reason: Keep Vein Open


Sodium Chloride (Saline Flush)  2.5 ml FLUSH ASDIRECTED PRN


   PRN Reason: Keep Vein Open


Sodium Chloride (Normal Saline)  10 ml IV ASDIRECTED PRN


   PRN Reason: IV Use


Sterile Water (Sterile Water For Irrigation)  1,000 ml IRR ASDIRECTED PRN


   PRN Reason: delivery


Terbutaline Sulfate (Brethine)  0.25 mg SUBCUT ASDIRECTED PRN


   PRN Reason: Tacysystole


Witch Hazel (Tucks)  1 pad TOP ASDIRECTED PRN


   PRN Reason: comfort care





Discontinued Medications





Calcium Gluconate (Calcium Gluconate)  1 gm IV ASDIRECTED PRN


   PRN Reason: respiratory distress


Fentanyl (Sublimaze) Confirm Administered Dose 100 mcg .ROUTE .[facility]


   Stop: 20 11:03


Magnesium Sulfate 4 gm/ Premix  100 mls @ 300 mls/hr IV BOLUS ONE


   Stop: 20 15:49


   Last Admin: 20 16:15 Dose:  333 mls/hr


   Documented by: 


Magnesium Sulfate (Magnesium Sulfate In Water Premix)  20 gm in 500 mls @ 50 

mls/hr IV ASDIRECTED [facility]; Protocol


   Last Admin: 20 16:33 Dose:  2 gm/hr, 50 mls/hr


   Documented by: 


Magnesium Sulfate (Magnesium Sulfate In Water Premix) Confirm Administered Dose 

20 gm in 500 mls @ as directed .ROUTE .[facility]


   Stop: 20 02:46


   Last Admin: 20 02:50 Dose:  50 mls/hr


   Documented by: 


Magnesium Sulfate (Magnesium Sulfate In Water Premix)  20 gm in 500 mls @ 50 

mls/hr IV ASDIRECTED [facility]; Protocol


   Last Admin: 20 09:28 Dose:  2 gm/hr, 50 mls/hr


   Documented by: 


Ropivacaine (Naropin 0.2%) Confirm Administered Dose 100 mls @ as directed 

.ROUTE .[facility]


   Stop: 20 11:04


Magnesium Sulfate 4 gm/ Premix  100 mls @ 300 mls/hr IV BOLUS ONE


   Stop: 20 18:20


Magnesium Sulfate (Magnesium Sulfate In Water Premix)  20 gm in 500 mls @ 50 ml

s/hr IV ASDIRECTED [facility]; Protocol


Sodium Chloride (Saline Flush)  10 ml FLUSH ASDIRECTED PRN


   PRN Reason: Keep Vein Open


Sodium Chloride (Saline Flush)  2.5 ml FLUSH ASDIRECTED PRN


   PRN Reason: Keep Vein Open


Sodium Chloride (Normal Saline)  10 ml IV ASDIRECTED PRN


   PRN Reason: IV Use











- Infant Interaction


Infant Disposition, Postpartum: [place] in Room with Family


Infant Feeding: Attempted Breastfeeding; Nursed Fair/Poor (supplementing with 

formula)


Support Person: 





- Postpartum Recovery Exam


Fundal Tone: Firm


Fundal Level: 1 Fingerbreadths Below Umbilicus


Fundal Placement: Midline


Lochia Amount: Scant


Lochia Color: Rubra/Red


Perineum Description: Other (see below)


Other Perinuem Description: 1st degree laceration


Episiotomy/Laceration: Approximated


Bladder Status: Voiding


Urinary Elimination: Voided





- Exam


General: Alert


Lungs: Clear to Auscultation, Normal Respiratory Effort


Cardiovascular: Regular Rate


GI/Abdominal Exam: Soft, Non-Tender


Extremities: Normal Inspection, Non-Tender


Skin: Warm, Dry, Intact


Wound/Incisions: Healing Well


Neurological: No New Focal Deficit


Psy/Mental Status: Normal Mood





- Problem List Review


Problem List Initiated/Reviewed/Updated: Yes





- My Orders


Last 24 Hours: 


My Active Orders





20 08:15


Deep Tendon Reflexes [WOMSER] [facility] 





20 09:15


Deep Tendon Reflexes [WOMSER] [facility] 





20 10:15


Deep Tendon Reflexes [WOMSER] [facility] 





20 Lunch


Regular Diet [DIET] 





20 11:15


Deep Tendon Reflexes [WOMSER] [facility] 





20 12:15


Deep Tendon Reflexes [WOMSER] [facility] 





20 13:15


Deep Tendon Reflexes [WOMSER] [facility] 





20 14:15


Deep Tendon Reflexes [WOMSER] [facility] 





20 15:15


Deep Tendon Reflexes [WOMSER] [facility] 





20 16:15


Deep Tendon Reflexes [WOMSER] [facility] 





20 17:15


Deep Tendon Reflexes [WOMSER] Q1H 





20 20:00


NIFEdipine [Procardia]   30 mg PO BID 














- Assessment


Assessment:: 





25 year old G1 now P1 female PPD 2 s/p spontaneous vaginal delivery at 37w1d 

with preeclampsia with severe features





- Plan


Plan:: 





Routine postpartum cares


* Rh positive, rubella immune, GBS negative


* Encourage ambulation and fluid intake when able


* PO pain medication ordered for PRN use


* Regular diet when tolerating PO


* Breastfeeding, nursing assistance as needed


* Monitor postpartum bleeding





Preeclampsia with severe features


* VSS, BP mild range last evening. Procardia 30mg daily initiated. BP 

  normotensive overnight. Will continue to monitor per protocol.


* Asymptomatic.


* s/p Magnesium sulfate x24hrs postpartum for seizure prophylaxis.


* Repeat preeclampsia labs and magnesium level, within normal limits.





Dispo: stable. Anticipate discharge today pending patient/infant status.

## 2020-11-29 NOTE — PCM.PNPP
- General Info


Date of Service: 20


Admission Dx/Problem (Free Text): 





25 year old G1 now P1 PPD 3 s/p spontaneous vaginal delivery with preeclampsia 

with severe features


Subjective Update: 





Resting comfortably in bed during rounds. Pain well controlled. Lochia 

decreasing. Ambulating and voiding without difficulty. Tolerating PO intake 

without nausea/vomiting. Denies headache, vision changes or RUQ pain. Breast 

pumping for baby going well.





- General Info


Date of Service: 20





- Patient Data


Vital Signs - Most Recent: 


                                Last Vital Signs











Temp  97.8 F   20 08:20


 


Pulse  79   20 08:20


 


Resp  14   20 08:20


 


BP  152/79 H  20 08:20


 


Pulse Ox  98   20 08:20








Repeat BP this /70


Weight - Most Recent: 186 lb


Med Orders - Current: 


                               Current Medications





Acetaminophen (Tylenol Extra Strength)  500 mg PO Q4H PRN


   PRN Reason: Pain


Acetaminophen (Tylenol Extra Strength)  1,000 mg PO Q4H PRN


   PRN Reason: Pain


   Last Admin: 20 15:31 Dose:  1,000 mg


   Documented by: 


Benzocaine/Menthol (Dermoplast Pain Relief 20%-0.5% Spray)  78 gm TOP ASDIRECTED

PRN


   PRN Reason: Perineal Comfort Measure


   Last Admin: 20 19:53 Dose:  1 can


   Documented by: 


Bisacodyl (Dulcolax)  10 mg RECTAL ONETIME PRN


   PRN Reason: Constipation


Butorphanol Tartrate (Stadol)  1 mg IVPUSH Q1H PRN


   PRN Reason: Pain


Calcium Gluconate (Calcium Gluconate)  1 gm IV ASDIRECTED PRN


   PRN Reason: respiratory distress


Carboprost Tromethamine (Hemabate Ds)  250 mcg IM ASDIRECTED PRN


   PRN Reason: Post Partum Hemorrhage


Docusate Sodium (Colace)  100 mg PO BID PRN


   PRN Reason: Constipation


   Last Admin: 20 16:14 Dose:  100 mg


   Documented by: 


Emollient Ointment (Lansinoh Hpa)  0 gm TOP ASDIRECTED PRN


   PRN Reason: Sore Nipples


Oxytocin/Sodium Chloride (Oxytocin 30 Unit/500 Ml-Ns)  30 unit in 500 mls @ 999 

mls/hr IV TITRATE [facility]


Tranexamic Acid 1,000 mg/ (Sodium Chloride)  110 mls @ 660 mls/hr IV ONETIME PRN


   PRN Reason: Bleeding


Lactated Ringer's (Ringers, Lactated)  1,000 mls @ 150 mls/hr IV ASDIRECTED [facility]


   Last Infusion: 20 11:46 Dose:  Infused


   Documented by: 


Sodium Chloride (Normal Saline)  1,000 mls @ 5 mls/hr IV ASDIRECTED [facility]


   Last Admin: 20 16:13 Dose:  5 mls/hr


   Documented by: 


Oxytocin/Sodium Chloride (Oxytocin 30 Unit/500 Ml-Ns)  30 unit in 500 mls @ 2 

mls/hr IV TITRATE [facility]; Protocol


   Last Titration: 20 17:45 Dose:  999 munits/min, 999 mls/hr


   Documented by: 


Ibuprofen (Motrin)  400 mg PO Q4H PRN


   PRN Reason: Pain


Ibuprofen (Motrin)  800 mg PO Q6H PRN


   PRN Reason: Pain


   Last Admin: 20 06:20 Dose:  800 mg


   Documented by: 


Lidocaine HCl (Xylocaine 1%)  50 ml INJECT ONETIME PRN


   PRN Reason: Laceration repair


Methylergonovine Maleate (Methergine)  0.2 mg IM ASDIRECTED PRN


   PRN Reason: Post Partum Hemorrhage


Misoprostol (Cytotec)  200 mcg PO ONETIME PRN


   PRN Reason: Post Partum Hemorrhage


Misoprostol (Cytotec)  25 mcg VAG ONETIME PRN


   PRN Reason: Cervical Ripening


Misoprostol (Cytotec)  25 mcg VAG Q4H PRN


   PRN Reason: Cervical Ripening


   Last Admin: 20 05:22 Dose:  25 mcg


   Documented by: 


Nalbuphine HCl (Nubain)  10 mg IVPUSH Q1H PRN


   PRN Reason: Pain (severe 7-10)


Nifedipine (Procardia)  30 mg PO BID [facility]


   Last Admin: 20 08:16 Dose:  30 mg


   Documented by: 


Ondansetron HCl (Zofran)  4 mg IVPUSH Q6H PRN


   PRN Reason: Nausea/Vomiting


Oxycodone HCl (Oxycodone)  5 mg PO Q2H PRN


   PRN Reason: Pain


Sodium Chloride (Saline Flush)  10 ml FLUSH ASDIRECTED PRN


   PRN Reason: Keep Vein Open


Sodium Chloride (Saline Flush)  2.5 ml FLUSH ASDIRECTED PRN


   PRN Reason: Keep Vein Open


Sodium Chloride (Normal Saline)  10 ml IV ASDIRECTED PRN


   PRN Reason: IV Use


Sodium Chloride (Saline Flush)  10 ml FLUSH ASDIRECTED PRN


   PRN Reason: Keep Vein Open


Sodium Chloride (Saline Flush)  2.5 ml FLUSH ASDIRECTED PRN


   PRN Reason: Keep Vein Open


Sodium Chloride (Normal Saline)  10 ml IV ASDIRECTED PRN


   PRN Reason: IV Use


Sterile Water (Sterile Water For Irrigation)  1,000 ml IRR ASDIRECTED PRN


   PRN Reason: delivery


Terbutaline Sulfate (Brethine)  0.25 mg SUBCUT ASDIRECTED PRN


   PRN Reason: Tacysystole


Witch Hazel (Tucks)  1 pad TOP ASDIRECTED PRN


   PRN Reason: comfort care





Discontinued Medications





Calcium Gluconate (Calcium Gluconate)  1 gm IV ASDIRECTED PRN


   PRN Reason: respiratory distress


Fentanyl (Sublimaze) Confirm Administered Dose 100 mcg .ROUTE .[facility]


   Stop: 20 11:03


Magnesium Sulfate 4 gm/ Premix  100 mls @ 300 mls/hr IV BOLUS ONE


   Stop: 20 15:49


   Last Admin: 20 16:15 Dose:  333 mls/hr


   Documented by: 


Magnesium Sulfate (Magnesium Sulfate In Water Premix)  20 gm in 500 mls @ 50 

mls/hr IV ASDIRECTED [facility]; Protocol


   Last Admin: 20 16:33 Dose:  2 gm/hr, 50 mls/hr


   Documented by: 


Magnesium Sulfate (Magnesium Sulfate In Water Premix) Confirm Administered Dose 

20 gm in 500 mls @ as directed .ROUTE .[facility]


   Stop: 20 02:46


   Last Admin: 20 02:50 Dose:  50 mls/hr


   Documented by: 


Magnesium Sulfate (Magnesium Sulfate In Water Premix)  20 gm in 500 mls @ 50 

mls/hr IV ASDIRECTED [facility]; Protocol


   Last Admin: 20 09:28 Dose:  2 gm/hr, 50 mls/hr


   Documented by: 


Ropivacaine (Naropin 0.2%) Confirm Administered Dose 100 mls @ as directed 

.ROUTE .[facility]


   Stop: 20 11:04


Magnesium Sulfate 4 gm/ Premix  100 mls @ 300 mls/hr IV BOLUS ONE


   Stop: 20 18:20


Magnesium Sulfate (Magnesium Sulfate In Water Premix)  20 gm in 500 mls @ 50 

mls/hr IV ASDIRECTED [facility]; Protocol


Sodium Chloride (Saline Flush)  10 ml FLUSH ASDIRECTED PRN


   PRN Reason: Keep Vein Open


Sodium Chloride (Saline Flush)  2.5 ml FLUSH ASDIRECTED PRN


   PRN Reason: Keep Vein Open


Sodium Chloride (Normal Saline)  10 ml IV ASDIRECTED PRN


   PRN Reason: IV Use











- Infant Interaction


Infant Disposition, Postpartum: [place] in Room with Family


Infant Feeding: Attempted Breastfeeding; Nursed Fair/Poor (supplementing with 

formula)


Support Person: 





- Postpartum Recovery Exam


Fundal Tone: Firm


Fundal Level: 1 Fingerbreadths Below Umbilicus


Fundal Placement: Midline


Lochia Amount: Scant


Lochia Color: Rubra/Red


Perineum Description: Other (see below)


Other Perinuem Description: 1st degree repaired laceration


Episiotomy/Laceration: Approximated


Bladder Status: Voiding


Urinary Elimination: Voided





- Exam


General: Alert


Lungs: Normal Respiratory Effort


Cardiovascular: Regular Rate


GI/Abdominal Exam: Soft, Non-Tender


Extremities: Normal Inspection, No Pedal Edema


Skin: Warm, Dry, Intact


Wound/Incisions: Healing Well


Neurological: No New Focal Deficit


Psy/Mental Status: Normal Mood





- Problem List Review


Problem List Initiated/Reviewed/Updated: Yes





- Assessment


Assessment:: 





25 year old G1 now P1 female PPD 3 s/p spontaneous vaginal delivery at 37w1d 

with preeclampsia with severe features





- Plan


Plan:: 





Routine postpartum cares


* Rh positive, rubella immune, GBS negative


* Encourage ambulation and fluid intake when able


* PO pain medication ordered for PRN use


* Regular diet when tolerating PO


* Breastfeeding, nursing assistance as needed


* Monitor postpartum bleeding





Preeclampsia with severe features


* VSS, BP mild range last evening. Procardia 30mg increased to BID due to 

  persistent mild range BP values. Normotensive this morning after medication. 

  Will continue to monitor per protocol.


* Recommended patient obtain a home blood pressure cuff and take blood pressure 

  daily while taking antihypertensive medication.


* Asymptomatic.


* s/p Magnesium sulfate x24hrs postpartum for seizure prophylaxis.


* Repeat preeclampsia labs and magnesium level, within normal limits.





Dispo: stable. Anticipate discharge today pending patient/infant status. 

Reviewed discharge precautions including fever/chills, severe headache, visual 

changes, RUQ pain, intractable nausea/vomiting, severe pain not resolved by 

Tylenol or ibuprofen and heavy vaginal bleeding. Patient instructed to take her 

BP daily and to present to clinic in 1 week for BP check. Follow up in 4 weeks 

for postpartum appointment. Questions elicited and answered.

## 2020-11-30 NOTE — OR
SURGEON:

CONSTANZA RAMIREZ MD

 

DATE OF PROCEDURE:  2020

 

PREOPERATIVE DIAGNOSES:

1. 37 weeks and 1 day intrauterine pregnancy.

2. Preeclampsia with severe features.

 

POSTOPERATIVE DIAGNOSES:

1. 37 weeks and 1 day intrauterine pregnancy.

2. Preeclampsia with severe features.

 

PROCEDURE:

Spontaneous vaginal delivery.

 

PRIMARY SURGEON:

Constanza Ramirez MD, present for the entire procedure.

 

ANESTHESIA:

Epidural.

 

COMPLICATIONS:

None.

 

ESTIMATED BLOOD LOSS:

250 mL.

 

INDICATIONS:

A 25-year-old  1 at 37 weeks and 1 day who was noted to have preeclampsia

with severe features at routine  visit on 2020.  She was then

admitted to Labor and Delivery later that evening for induction of labor and was

administered magnesium sulfate for seizure prophylaxis.

 

FINDINGS:

Normal-appearing small for gestational age male infant, in cephalic

presentation, clear amniotic fluid, Apgar score 7 and 9, weight 2300 g.

 

PROCEDURE IN DETAIL:

The patient was admitted to Labor and Delivery on 2020 with a diagnosis of

preeclampsia with severe features.  She was started on magnesium sulfate for

seizure prophylaxis and induction of labor was initiated.  She received 4 doses

of vaginal Cytotec overnight.  At approximately 10 o'clock on 2020, the

patient was noted to be 3 cm dilated, 50% effaced and -2 station.  Pitocin was

started and the patient received an epidural shortly thereafter.  Labor

progressed spontaneously and spontaneous rupture of membranes occurred with

clear fluid.  At approximately 1640 hours, the patient was noted to be

completely dilated and 1+ station.  Pushing efforts were initiated.  She

continued to push with good efforts and I was called to the room for delivery.

The infant delivered atraumatically.  Nose and mouth were suctioned with bulb.

After 60 seconds, the cord was clamped and cut, and the infant was handed off to

mother and awaiting nursing staff.  I then evaluated the baby at the [facility] with

pediatrician who attended the delivery.  Arterial, venous and cord blood gases

were then obtained and the placenta was then delivered intact.  An inspection of

perineum was then performed and a first-degree perineal laceration and repaired

in usual fashion with 2-0 Vicryl.  Hemostasis was then noted.  Uterus remained

firm and at the umbilicus.  Sponge, lap, and needle count were correct x2.  The

patient tolerated the procedure well.  We will continue magnesium sulfate for 24

years for seizure prophylaxis.

 

 

MARORAC / MODL

DD:  2020 19:09:57

DT:  2020 19:53:43

Job #:  [id number]